# Patient Record
Sex: MALE | Race: WHITE | NOT HISPANIC OR LATINO | Employment: FULL TIME | URBAN - METROPOLITAN AREA
[De-identification: names, ages, dates, MRNs, and addresses within clinical notes are randomized per-mention and may not be internally consistent; named-entity substitution may affect disease eponyms.]

---

## 2023-03-27 ENCOUNTER — OFFICE VISIT (OUTPATIENT)
Dept: URGENT CARE | Facility: CLINIC | Age: 58
End: 2023-03-27

## 2023-03-27 VITALS
WEIGHT: 227 LBS | TEMPERATURE: 98 F | RESPIRATION RATE: 20 BRPM | OXYGEN SATURATION: 98 % | SYSTOLIC BLOOD PRESSURE: 134 MMHG | DIASTOLIC BLOOD PRESSURE: 78 MMHG | HEART RATE: 78 BPM

## 2023-03-27 DIAGNOSIS — S61.412A LACERATION OF LEFT HAND WITHOUT FOREIGN BODY, INITIAL ENCOUNTER: Primary | ICD-10-CM

## 2023-03-27 RX ORDER — SITAGLIPTIN AND METFORMIN HYDROCHLORIDE 1000; 50 MG/1; MG/1
1 TABLET, FILM COATED ORAL 2 TIMES DAILY WITH MEALS
COMMUNITY
Start: 2022-11-28

## 2023-03-27 RX ORDER — ATORVASTATIN CALCIUM 10 MG/1
TABLET, FILM COATED ORAL
COMMUNITY
Start: 2023-02-23

## 2023-03-27 RX ORDER — LISINOPRIL 20 MG/1
TABLET ORAL
COMMUNITY
Start: 2023-02-27

## 2023-03-27 RX ORDER — CEPHALEXIN 500 MG/1
500 CAPSULE ORAL EVERY 12 HOURS SCHEDULED
Qty: 14 CAPSULE | Refills: 0 | Status: SHIPPED | OUTPATIENT
Start: 2023-03-27 | End: 2023-04-03

## 2023-03-27 NOTE — LETTER
March 27, 2023     Patient: Sil Lundy   YOB: 1965   Date of Visit: 3/27/2023       To Whom It May Concern: It is my medical opinion that Sil Lundy may return to work on 3/27/2023  It is reccommended that he limit extensive use of left hand today  If you have any questions or concerns, please don't hesitate to call           Sincerely,        RALF Augustine    CC: No Recipients

## 2023-03-27 NOTE — PATIENT INSTRUCTIONS
You have been prescribed cephalexin for prevention of infection  It was medically recommended that you have the laceration sutured but you refused  Dermabond glue was applied - it may open back up since it did need sutures  You are to keep covered once the glue dries - DO NOT PLACE ANY TYPE OF OINTMENT OVER THE GLUE  You are to apply a dressing and laz over the wound when working  Take tylenol or motrin for pain     You are to follow up with your PCP in 3-5 days   Go to the ED if symptoms worsen

## 2023-03-27 NOTE — PROGRESS NOTES
[facility]        NAME: William Baez is a 65 y o  male  : 1965    MRN: [id number]  DATE: 2023  TIME: 9:53 AM    Assessment and Plan   Laceration of left hand without foreign body, initial encounter [S61 412A]  1  Laceration of left hand without foreign body, initial encounter  cephalexin (KEFLEX) 500 mg capsule            Patient Instructions       Follow up with PCP in 3-5 days  Proceed to  ER if symptoms worsen  You have been prescribed cephalexin for prevention of infection  It was medically recommended that you have the laceration sutured but you refused  Dermabond glue was applied - it may open back up since it did need sutures  You are to keep covered once the glue dries - DO NOT PLACE ANY TYPE OF OINTMENT OVER THE GLUE  You are to apply a dressing and laz over the wound when working  Take tylenol or motrin for pain  You are to follow up with your PCP in 3-5 days   Go to the ED if symptoms worsen          Chief Complaint     Chief Complaint   Patient presents with   • Laceration     Cut left hand using  at work this am          History of Present Illness       This is a 65year old male who was using a  around 8 am and cut the heel of his left hand with the  while at work  He states that Tetanus is UTD  He did wash the hand out at work  He denies pain  He has full use of the left hand  Wound assessed by provider and pt was informed that sutures were required and he refused x 2 witness by RN staff on duty  Review of Systems   Review of Systems   Constitutional: Negative  HENT: Negative  Eyes: Negative  Respiratory: Negative  Cardiovascular: Negative  Gastrointestinal: Negative  Endocrine: Negative  Genitourinary: Negative  Musculoskeletal: Negative  Skin: Positive for wound  Allergic/Immunologic: Negative  Neurological: Negative  Hematological: Negative  Psychiatric/Behavioral: Negative  Current Medications       Current Outpatient Medications:   •  atorvastatin (LIPITOR) 10 mg tablet, , Disp: , Rfl:   •  cephalexin (KEFLEX) 500 mg capsule, Take 1 capsule (500 mg total) by mouth every 12 (twelve) hours for 7 days, Disp: 14 capsule, Rfl: 0  •  lisinopril (ZESTRIL) 20 mg tablet, , Disp: , Rfl:   •  sitaGLIPtin-metFORMIN (Janumet)  MG per tablet, Take 1 tablet by mouth 2 (two) times a day with meals, Disp: , Rfl:     Current Allergies     Allergies as of 03/27/2023 - Reviewed 03/27/2023   Allergen Reaction Noted   • Choline fenofibrate Swelling 04/28/2017            The following portions of the patient's history were reviewed and updated as appropriate: allergies, current medications, past family history, past medical history, past social history, past surgical history and problem list      History reviewed  No pertinent past medical history  History reviewed  No pertinent surgical history  History reviewed  No pertinent family history  Medications have been verified  Objective   /78   Pulse 78   Temp 98 °F (36 7 °C)   Resp 20   Wt 103 kg (227 lb)   SpO2 98%   No LMP for male patient  Physical Exam     Physical Exam  Vitals and nursing note reviewed  Constitutional:       General: He is not in acute distress  Appearance: Normal appearance  He is obese  He is not ill-appearing, toxic-appearing or diaphoretic  HENT:      Head: Normocephalic and atraumatic  Nose: Nose normal       Mouth/Throat:      Mouth: Mucous membranes are moist    Eyes:      Extraocular Movements: Extraocular movements intact  Cardiovascular:      Rate and Rhythm: Normal rate  Pulses: Normal pulses  Pulmonary:      Effort: Pulmonary effort is normal    Musculoskeletal:         General: Normal range of motion  Hands:       Cervical back: Normal range of motion  Skin:     General: Skin is warm and dry        Capillary Refill: Capillary refill takes less "than 2 seconds  Neurological:      General: No focal deficit present  Mental Status: He is alert and oriented to person, place, and time  Psychiatric:         Mood and Affect: Mood normal          Behavior: Behavior normal          Thought Content: Thought content normal          Judgment: Judgment normal          Laceration repair    Date/Time: 3/27/2023 9:35 AM  Performed by: RALF Guillermo  Authorized by: RALF Guillermo   Consent: The procedure was performed in an emergent situation  Verbal consent obtained  Written consent obtained  Risks and benefits: risks, benefits and alternatives were discussed  Consent given by: patient  Patient understanding: patient states understanding of the procedure being performed  Patient consent: the patient's understanding of the procedure matches consent given  Procedure consent: procedure consent matches procedure scheduled  Relevant documents: relevant documents present and verified  Test results: test results available and properly labeled  Site marked: the operative site was marked  Required items: required blood products, implants, devices, and special equipment available  Patient identity confirmed: verbally with patient and hospital-assigned identification number  Time out: Immediately prior to procedure a \"time out\" was called to verify the correct patient, procedure, equipment, support staff and site/side marked as required  Body area: upper extremity  Location details: left hand  Laceration length: 30 cm  Foreign bodies: no foreign bodies  Tendon involvement: none  Nerve involvement: none  Vascular damage: no    Sedation:  Patient sedated: no      Wound Dehiscence:  Superficial Wound Dehiscence: simple closure      Procedure Details:  Preparation: Patient was prepped and draped in the usual sterile fashion    Amount of cleaning: standard  Debridement: minimal  Skin closure: glue  Approximation: close  Approximation difficulty: " simple  Patient tolerance: patient tolerated the procedure well with no immediate complications  Comments: Cleaned wound- standard cleaning  Blackened skin removed from edges  Wound recleansed  Dermabond glue applied with aligning skin and applying forced air to the dermabond as it is applied  Wound appears well aligned with dermabond  Pt given telfa and laz to wrap when dermabond is dry

## 2023-03-28 ENCOUNTER — OCCMED (OUTPATIENT)
Dept: URGENT CARE | Facility: CLINIC | Age: 58
End: 2023-03-28

## 2023-03-28 DIAGNOSIS — S61.412S: Primary | ICD-10-CM

## 2023-04-03 ENCOUNTER — OCCMED (OUTPATIENT)
Dept: URGENT CARE | Facility: CLINIC | Age: 58
End: 2023-04-03

## 2023-04-03 DIAGNOSIS — S61.412S: Primary | ICD-10-CM

## 2023-04-03 DIAGNOSIS — T81.30XS: ICD-10-CM
